# Patient Record
Sex: FEMALE | Race: WHITE | Employment: OTHER | URBAN - METROPOLITAN AREA
[De-identification: names, ages, dates, MRNs, and addresses within clinical notes are randomized per-mention and may not be internally consistent; named-entity substitution may affect disease eponyms.]

---

## 2019-08-26 RX ORDER — ESCITALOPRAM OXALATE 20 MG/1
20 TABLET ORAL DAILY
COMMUNITY

## 2019-08-26 RX ORDER — LISINOPRIL AND HYDROCHLOROTHIAZIDE 10; 12.5 MG/1; MG/1
1 TABLET ORAL DAILY
COMMUNITY

## 2019-08-26 NOTE — PERIOP NOTES
[facility]  Ambulatory Surgery Unit  Pre-operative Instructions    Surgery/Procedure Date  Thursday, August 29, 2019            Tentative Arrival Time 1145      1. On the day of your surgery/procedure, please report to the Ambulatory Surgery Unit Registration Desk and sign in at your designated time. The Ambulatory Surgery Unit is located in [facility] on the [facility] side of the [facility] across from the [facility]. Please have all of your health insurance cards and a photo ID. 2. You must have someone with you to drive you home, as you should not drive a car for 24 hours following anesthesia. Please make arrangements for a responsible adult friend or family member to stay with you for at least the first 24 hours after your surgery. 3. Do not have anything to eat or drink (including water, gum, mints, coffee, juice) after 11:59 PM. This may not apply to medications prescribed by your physician. (Please note below the special instructions with medications to take the morning of surgery, if applicable.)    4. We recommend you do not drink any alcoholic beverages for 24 hours before and after your surgery. 5. Contact your surgeons office for instructions on the following medications: non-steroidal anti-inflammatory drugs (i.e. Advil, Aleve), vitamins, and supplements. (Some surgeons will want you to stop these medications prior to surgery and others may allow you to take them)   **If you are currently taking Plavix, Coumadin, Aspirin and/or other blood-thinning agents, contact your surgeon for instructions. ** Your surgeon will partner with the physician prescribing these medications to determine if it is safe to stop or if you need to continue taking. Please do not stop taking these medications without instructions from your surgeon.     6. In an effort to help prevent surgical site infection, we ask that you shower with an anti-bacterial soap (i.e. Dial/Safeguard, or the soap provided to you at your preadmission testing appointment) for 3 days prior to and on the morning of surgery, using a fresh towel after each shower. (Please begin this process with fresh bed linens.) Do not apply any lotions, powders, or deodorants after the shower on the day of your procedure. If applicable, please do not shave the operative site for 48 hours prior to surgery. 7. Wear comfortable clothes. Wear glasses instead of contacts. Do not bring any jewelry or money (other than copays or fees as instructed). Do not wear make-up, particularly mascara, the morning of your surgery. Do not wear nail polish, particularly if you are having foot /hand surgery. Wear your hair loose or down, no ponytails, buns, anson pins or clips. All body piercings must be removed. 8. You should understand that if you do not follow these instructions your surgery may be cancelled. If your physical condition changes (i.e. fever, cold or flu) please contact your surgeon as soon as possible. 9. It is important that you be on time. If a situation occurs where you may be late, or if you have any questions or problems, please call [phone].    10. Your surgery time may be subject to change. You will receive a phone call the day prior to surgery to confirm your arrival time. 11. Pediatric patients: please bring a change of clothes, diapers, bottle/sippy cup, pacifier, etc.      Special Instructions: Take all medications and inhalers, as prescribed, on the morning of surgery with a sip of water. I understand a pre-operative phone call will be made to verify my surgery time. In the event that I am not available, I give permission for a message to be left on my answering service and/or with another person?       yes    Preop instructions reviewed  Pt verbalized understanding.      ___________________      ___________________      ________________  (Signature of Patient)          (Witness) (Date and Time)

## 2019-08-28 ENCOUNTER — ANESTHESIA EVENT (OUTPATIENT)
Dept: SURGERY | Age: 64
End: 2019-08-28
Payer: MEDICARE

## 2019-08-28 NOTE — H&P
This is a 64-year-old female who presents today with right wrist pain. Localizes it dorsally. This has been present for 6 weeks. No injury. Pain is 6/10. Does have a plate in her wrist from a surgery in 2007 performed by another surgeon. Has had no recent treatment. Past medical history, past surgical history, review of systems reviewed on intake form     Objective:   Constitutional:  No acute distress. Well nourished. Well developed. Eyes:  Sclera are nonicteric. Respiratory:  No labored breathing. Cardiovascular:  No marked edema. Skin:  No marked skin ulcers. Neurological:  see below  Psychiatric: Alert and oriented x3. Musculoskeletal   Examination of the right hand and wrist demonstrates she does have painful thumb retropulsion and EPL activation. Does have tenderness over the EPL and at Ethan's. Has tenderness palpation of the distal radius as well. Does have some pain with wrist motion. Neurovascularly intact distally.     Radiographs:       X-ray Wrist Right 3+ Views ([id number])     Result Date: 8/21/2019  Sitting. PA, Lat, Oblique, Tilted Lat.      Impression: There is plate fixation of a distal radius fracture. The radius is healed. I do have concerns for an intra-articular screw at the radial styloid as well as significant dorsal prominence of screws.        Assessment:      1. Right wrist pain    2. Painful orthopaedic hardware    3. Right wrist tendonitis          Plan: At this time she does have EPL irritation and obvious dorsal prominence of some of her screws as well as a likely intra-articular screw. At this time I do recommend hardware removal to prevent any further damage to the tendons. I will also likely perform an EPL transposition at that time. Risks benefits alternatives of this including incomplete relief due to possible arthritis and other causes are discussed. She consents to proceed. A surgical date is chosen.     Date of Surgery Update:  Dell Augustin was seen and examined. History and physical has been reviewed. The patient has been examined.  There have been no significant clinical changes since the completion of the originally dated History and Physical.    Signed By: Nelson Sanabria MD     August 29, 2019 11:51 AM

## 2019-08-29 ENCOUNTER — ANESTHESIA (OUTPATIENT)
Dept: SURGERY | Age: 64
End: 2019-08-29
Payer: MEDICARE

## 2019-08-29 ENCOUNTER — HOSPITAL ENCOUNTER (OUTPATIENT)
Age: 64
Setting detail: OUTPATIENT SURGERY
Discharge: HOME | End: 2019-08-29
Attending: ORTHOPAEDIC SURGERY | Admitting: ORTHOPAEDIC SURGERY
Payer: MEDICARE

## 2019-08-29 VITALS
SYSTOLIC BLOOD PRESSURE: 117 MMHG | OXYGEN SATURATION: 95 % | WEIGHT: 230 LBS | RESPIRATION RATE: 18 BRPM | BODY MASS INDEX: 36.96 KG/M2 | HEIGHT: 66 IN | TEMPERATURE: 98.1 F | HEART RATE: 91 BPM | DIASTOLIC BLOOD PRESSURE: 83 MMHG

## 2019-08-29 DIAGNOSIS — G89.18 POSTOPERATIVE PAIN: Primary | ICD-10-CM

## 2019-08-29 PROCEDURE — 77030040356 HC CORD BPLR FRCP COVD -A: Performed by: ORTHOPAEDIC SURGERY

## 2019-08-29 PROCEDURE — 76210000040 HC AMBSU PH I REC FIRST 0.5 HR: Performed by: ORTHOPAEDIC SURGERY

## 2019-08-29 PROCEDURE — 76030000001 HC AMB SURG OR TIME 1 TO 1.5: Performed by: ORTHOPAEDIC SURGERY

## 2019-08-29 PROCEDURE — 77030039497 HC CST PAD STERILE CHCS -A: Performed by: ORTHOPAEDIC SURGERY

## 2019-08-29 PROCEDURE — 74011250636 HC RX REV CODE- 250/636: Performed by: NURSE ANESTHETIST, CERTIFIED REGISTERED

## 2019-08-29 PROCEDURE — 76210000046 HC AMBSU PH II REC FIRST 0.5 HR: Performed by: ORTHOPAEDIC SURGERY

## 2019-08-29 PROCEDURE — 74011250636 HC RX REV CODE- 250/636: Performed by: ANESTHESIOLOGY

## 2019-08-29 PROCEDURE — 76060000062 HC AMB SURG ANES 1 TO 1.5 HR: Performed by: ORTHOPAEDIC SURGERY

## 2019-08-29 PROCEDURE — 77030002916 HC SUT ETHLN J&J -A: Performed by: ORTHOPAEDIC SURGERY

## 2019-08-29 PROCEDURE — 74011250636 HC RX REV CODE- 250/636: Performed by: ORTHOPAEDIC SURGERY

## 2019-08-29 PROCEDURE — 77030018836 HC SOL IRR NACL ICUM -A: Performed by: ORTHOPAEDIC SURGERY

## 2019-08-29 PROCEDURE — 77030003601 HC NDL NRV BLK BBMI -A: Performed by: NURSE ANESTHETIST, CERTIFIED REGISTERED

## 2019-08-29 PROCEDURE — 74011000250 HC RX REV CODE- 250: Performed by: NURSE ANESTHETIST, CERTIFIED REGISTERED

## 2019-08-29 RX ORDER — SODIUM CHLORIDE, SODIUM LACTATE, POTASSIUM CHLORIDE, CALCIUM CHLORIDE 600; 310; 30; 20 MG/100ML; MG/100ML; MG/100ML; MG/100ML
25 INJECTION, SOLUTION INTRAVENOUS CONTINUOUS
Status: DISCONTINUED | OUTPATIENT
Start: 2019-08-29 | End: 2019-08-29 | Stop reason: HOSPADM

## 2019-08-29 RX ORDER — HYDROMORPHONE HYDROCHLORIDE 1 MG/ML
.2-.5 INJECTION, SOLUTION INTRAMUSCULAR; INTRAVENOUS; SUBCUTANEOUS ONCE
Status: DISCONTINUED | OUTPATIENT
Start: 2019-08-29 | End: 2019-08-29 | Stop reason: HOSPADM

## 2019-08-29 RX ORDER — DIPHENHYDRAMINE HYDROCHLORIDE 50 MG/ML
12.5 INJECTION, SOLUTION INTRAMUSCULAR; INTRAVENOUS AS NEEDED
Status: DISCONTINUED | OUTPATIENT
Start: 2019-08-29 | End: 2019-08-29 | Stop reason: HOSPADM

## 2019-08-29 RX ORDER — ONDANSETRON 2 MG/ML
INJECTION INTRAMUSCULAR; INTRAVENOUS AS NEEDED
Status: DISCONTINUED | OUTPATIENT
Start: 2019-08-29 | End: 2019-08-29 | Stop reason: HOSPADM

## 2019-08-29 RX ORDER — ROPIVACAINE HYDROCHLORIDE 5 MG/ML
INJECTION, SOLUTION EPIDURAL; INFILTRATION; PERINEURAL
Status: COMPLETED | OUTPATIENT
Start: 2019-08-29 | End: 2019-08-29

## 2019-08-29 RX ORDER — SODIUM CHLORIDE 0.9 % (FLUSH) 0.9 %
5-40 SYRINGE (ML) INJECTION EVERY 8 HOURS
Status: DISCONTINUED | OUTPATIENT
Start: 2019-08-29 | End: 2019-08-29 | Stop reason: HOSPADM

## 2019-08-29 RX ORDER — OXYCODONE AND ACETAMINOPHEN 5; 325 MG/1; MG/1
1 TABLET ORAL
Status: DISCONTINUED | OUTPATIENT
Start: 2019-08-29 | End: 2019-08-29 | Stop reason: HOSPADM

## 2019-08-29 RX ORDER — PROPOFOL 10 MG/ML
INJECTION, EMULSION INTRAVENOUS AS NEEDED
Status: DISCONTINUED | OUTPATIENT
Start: 2019-08-29 | End: 2019-08-29 | Stop reason: HOSPADM

## 2019-08-29 RX ORDER — SODIUM CHLORIDE 0.9 % (FLUSH) 0.9 %
5-40 SYRINGE (ML) INJECTION AS NEEDED
Status: DISCONTINUED | OUTPATIENT
Start: 2019-08-29 | End: 2019-08-29 | Stop reason: HOSPADM

## 2019-08-29 RX ORDER — MIDAZOLAM HYDROCHLORIDE 1 MG/ML
INJECTION, SOLUTION INTRAMUSCULAR; INTRAVENOUS AS NEEDED
Status: DISCONTINUED | OUTPATIENT
Start: 2019-08-29 | End: 2019-08-29 | Stop reason: HOSPADM

## 2019-08-29 RX ORDER — MIDAZOLAM HYDROCHLORIDE 1 MG/ML
INJECTION, SOLUTION INTRAMUSCULAR; INTRAVENOUS
Status: COMPLETED
Start: 2019-08-29 | End: 2019-08-29

## 2019-08-29 RX ORDER — ROPIVACAINE HYDROCHLORIDE 5 MG/ML
INJECTION, SOLUTION EPIDURAL; INFILTRATION; PERINEURAL
Status: COMPLETED
Start: 2019-08-29 | End: 2019-08-29

## 2019-08-29 RX ORDER — PROPOFOL 10 MG/ML
INJECTION, EMULSION INTRAVENOUS
Status: DISCONTINUED | OUTPATIENT
Start: 2019-08-29 | End: 2019-08-29 | Stop reason: HOSPADM

## 2019-08-29 RX ORDER — CEFAZOLIN SODIUM/WATER 2 G/20 ML
2 SYRINGE (ML) INTRAVENOUS ONCE
Status: COMPLETED | OUTPATIENT
Start: 2019-08-29 | End: 2019-08-29

## 2019-08-29 RX ORDER — MORPHINE SULFATE 10 MG/ML
2 INJECTION, SOLUTION INTRAMUSCULAR; INTRAVENOUS
Status: DISCONTINUED | OUTPATIENT
Start: 2019-08-29 | End: 2019-08-29 | Stop reason: HOSPADM

## 2019-08-29 RX ORDER — FENTANYL CITRATE 50 UG/ML
25 INJECTION, SOLUTION INTRAMUSCULAR; INTRAVENOUS
Status: DISCONTINUED | OUTPATIENT
Start: 2019-08-29 | End: 2019-08-29 | Stop reason: HOSPADM

## 2019-08-29 RX ORDER — LIDOCAINE HYDROCHLORIDE 10 MG/ML
0.1 INJECTION, SOLUTION EPIDURAL; INFILTRATION; INTRACAUDAL; PERINEURAL AS NEEDED
Status: DISCONTINUED | OUTPATIENT
Start: 2019-08-29 | End: 2019-08-29 | Stop reason: HOSPADM

## 2019-08-29 RX ORDER — OXYCODONE HYDROCHLORIDE 5 MG/1
5-10 TABLET ORAL
Qty: 35 TAB | Refills: 0 | Status: SHIPPED | OUTPATIENT
Start: 2019-08-29 | End: 2019-09-12

## 2019-08-29 RX ORDER — LIDOCAINE HYDROCHLORIDE 20 MG/ML
INJECTION, SOLUTION EPIDURAL; INFILTRATION; INTRACAUDAL; PERINEURAL AS NEEDED
Status: DISCONTINUED | OUTPATIENT
Start: 2019-08-29 | End: 2019-08-29 | Stop reason: HOSPADM

## 2019-08-29 RX ORDER — DEXAMETHASONE SODIUM PHOSPHATE 4 MG/ML
INJECTION, SOLUTION INTRA-ARTICULAR; INTRALESIONAL; INTRAMUSCULAR; INTRAVENOUS; SOFT TISSUE AS NEEDED
Status: DISCONTINUED | OUTPATIENT
Start: 2019-08-29 | End: 2019-08-29 | Stop reason: HOSPADM

## 2019-08-29 RX ORDER — PHENYLEPHRINE HCL IN 0.9% NACL 0.4MG/10ML
SYRINGE (ML) INTRAVENOUS AS NEEDED
Status: DISCONTINUED | OUTPATIENT
Start: 2019-08-29 | End: 2019-08-29 | Stop reason: HOSPADM

## 2019-08-29 RX ORDER — ONDANSETRON 2 MG/ML
4 INJECTION INTRAMUSCULAR; INTRAVENOUS AS NEEDED
Status: DISCONTINUED | OUTPATIENT
Start: 2019-08-29 | End: 2019-08-29 | Stop reason: HOSPADM

## 2019-08-29 RX ORDER — FENTANYL CITRATE 50 UG/ML
INJECTION, SOLUTION INTRAMUSCULAR; INTRAVENOUS AS NEEDED
Status: DISCONTINUED | OUTPATIENT
Start: 2019-08-29 | End: 2019-08-29 | Stop reason: HOSPADM

## 2019-08-29 RX ORDER — EPHEDRINE SULFATE/0.9% NACL/PF 50 MG/5 ML
SYRINGE (ML) INTRAVENOUS AS NEEDED
Status: DISCONTINUED | OUTPATIENT
Start: 2019-08-29 | End: 2019-08-29 | Stop reason: HOSPADM

## 2019-08-29 RX ADMIN — LIDOCAINE HYDROCHLORIDE 40 MG: 20 INJECTION, SOLUTION EPIDURAL; INFILTRATION; INTRACAUDAL; PERINEURAL at 12:08

## 2019-08-29 RX ADMIN — FENTANYL CITRATE 50 MCG: 50 INJECTION, SOLUTION INTRAMUSCULAR; INTRAVENOUS at 12:08

## 2019-08-29 RX ADMIN — PROPOFOL 20 MG: 10 INJECTION, EMULSION INTRAVENOUS at 12:08

## 2019-08-29 RX ADMIN — Medication 120 MCG: at 12:50

## 2019-08-29 RX ADMIN — ROPIVACAINE HYDROCHLORIDE 30 ML: 5 INJECTION, SOLUTION EPIDURAL; INFILTRATION; PERINEURAL at 11:04

## 2019-08-29 RX ADMIN — MIDAZOLAM HYDROCHLORIDE 3 MG: 1 INJECTION, SOLUTION INTRAMUSCULAR; INTRAVENOUS at 11:09

## 2019-08-29 RX ADMIN — PROPOFOL 20 MG: 10 INJECTION, EMULSION INTRAVENOUS at 12:10

## 2019-08-29 RX ADMIN — Medication 2 G: at 12:10

## 2019-08-29 RX ADMIN — ONDANSETRON HYDROCHLORIDE 4 MG: 2 INJECTION, SOLUTION INTRAMUSCULAR; INTRAVENOUS at 12:32

## 2019-08-29 RX ADMIN — FENTANYL CITRATE 25 MCG: 50 INJECTION, SOLUTION INTRAMUSCULAR; INTRAVENOUS at 12:25

## 2019-08-29 RX ADMIN — Medication 120 MCG: at 12:59

## 2019-08-29 RX ADMIN — PROPOFOL 75 MCG/KG/MIN: 10 INJECTION, EMULSION INTRAVENOUS at 12:08

## 2019-08-29 RX ADMIN — Medication 80 MCG: at 12:42

## 2019-08-29 RX ADMIN — SODIUM CHLORIDE, SODIUM LACTATE, POTASSIUM CHLORIDE, AND CALCIUM CHLORIDE 25 ML/HR: 600; 310; 30; 20 INJECTION, SOLUTION INTRAVENOUS at 10:44

## 2019-08-29 RX ADMIN — Medication 20 MG: at 12:31

## 2019-08-29 RX ADMIN — DEXAMETHASONE SODIUM PHOSPHATE 4 MG: 4 INJECTION, SOLUTION INTRAMUSCULAR; INTRAVENOUS at 12:32

## 2019-08-29 NOTE — PERIOP NOTES
1317-Pt. To pacu, awake, alert. Vss. Denies pain. Dressing to right arm intact. 1330-Discharge instructions reviewed w/pt. And friend. They verbalized understanding. Prescription given. 1355-Pt. And friend stated ready for discharge. Vss. Denies pain. Dressing to right arm intact. Lungs clear. Sling applied to right arm. Pt. Discharged to car via wheelchair w/all belongings.

## 2019-08-29 NOTE — PERIOP NOTES
Dr. Marbella Ring performed nerve block in preop using ultrasound machine to RUE. Pt on CM x3, 02 by NC at 2L, patient responsive when spoken to. Able to answer questions appropriately. Pt did receive Versed 3 mg IV given by Dr. Marbella Ring for sedation. Pt tolerated procedure well. VSS and will continue to monitor.

## 2019-08-29 NOTE — PERIOP NOTES
Kat Moreno  1955  [id number]    Situation:  Verbal report given from: Neal Johnson CRNA  Procedure: Procedure(s):  RIGHT WRIST REMOVAL OF HARDWARE AND EXTENSOR POLLICUS LONGUS TRANSPOSITION  (REGIONAL W/BLK)    Background:    Preoperative diagnosis: Painful orthopaedic hardware ([address] [T84.84XA]  Wrist tendonitis [M77.8]    Postoperative diagnosis: Painful orthopaedic hardware [facility]) Evlyn Clap  Wrist tendonitis [M77.8]    :  Dr. Margreta Severe    Assistant(s): Circ-1: Toño Ugarte RN  Scrub Tech-1: Lizette GALLEGOS RT  Scrub RN-Relief: Alan Lorenzana RN    Specimens: * No specimens in log *    Assessment:  Intra-procedure medications         Anesthesia gave intra-procedure sedation and medications, see anesthesia flow sheet     Intravenous fluids: LR@ KVO     Vital signs stable       Recommendation:    Permission to share finding with friend Dasha Mares : yes

## 2019-08-29 NOTE — OP NOTES
PATIENT NAME:  Paula Durant     SURGEON:    Itzel Winslow MD     DATE OF SURGERY: 8/29/2019      [facility]      PREOPERATIVE DIAGNOSIS: Retained painful hardware right wrist, right EPL synovitis/injury     POSTOPERATIVE DIAGNOSIS:  Same     PROCEDURE:  Removal of hardware right wrist, right EPL dorsal transposition     ANESTHESIA: Brachial Plexus block/IV sedation      BLOOD LOSS:  Minimal     COMPLICATIONS: none      TOURNIQUET TIME: 36 min      OPERATIVE INDICATIONS:They had undergone an open reduction, internal fixation of their right distal radius with prominent dorsal and intra-articular screws with subsequent wrist pain and EPL irritation/injury. They therefore indicated for surgery. After risks benefits alternatives were discussed with the patient they consented to proceed.     DESCRIPTION  OF PROCEDURE:   On the date of operation the patient presented stable to the holding area. The correct upper extremity was identified and marked. Regional anesthesia was induced by the anesthesia team.  They were then brought to the operating room and placed supine with the operative upper extremity on a hand table and a nonsterile upper arm tourniquet placed. The upper extremity was then prepped and draped in the standard sterile fashion. After formal time-out was performed the upper extremity was elevated and exsanguinated and the upper arm tourniquet was inflated to 250 mm of mercury.     A standard volar Ethel Gu approach was then taken to the distal radius at the prior incision. Skin and subcutaneous tissue were taken down sharply and the FCR was visualized. The FCR sheath was then carefully incised and this was retracted ulnarly. Fascia overlying the flexor pollicis was then carefully incised and this was also retracted ulnarly. The pronator quadratus was then visualized and this was stripped subperiosteally off bone. The plate was then visualized.  This was debrided of scar tissue and the distal locking screws were removed. The most distal cortical screw had significant bony overgrowth so an osteotome did need to be used to fully approach the screw and the remove it. The remainder of the screws were then removed followed by the plate. The distal radius was assessed and no fractures remaining were noted. I then made a dorsal incision just ulnar to listers tubercle. Skin and subcutaneous tissue was taken down sharply and deeper dissection was performed with dissecting scissors to the extensor retinaculum. The 3rd dorsal compartment was opened and released. The ePL was evaluated and there was significant irritation and hyperemia. The EPl was then transposed dorsally and the retinaculum closed back under with 3-0 ethibond. Final x-rays were obtained. Tourniquet was then taken down. Copious irrigation was performed and all bleeding sites were carefully coagulated with bipolar cautery. Skin was closed with 3 0 nylon in a interrupted horizontal mattress fashion. Patient was then placed in sterile bulky dressing followed by a thumb spica splint.   They were then taken stable to the recovery room with all instrument, needle and lap counts correct at the end of the case.

## 2019-08-29 NOTE — DISCHARGE INSTRUCTIONS
[facility]  Post-operative instructions  For: Idris Farnsworth    Your first postop appointment should be scheduled with Dr. Elver Nayak for 2 weeks post-op. [address], [address]  [place]  Phone: [phone]  Hand Therapy Phone: [phone]  Fax: [phone]    Please follow these instructions for a safe and speedy recovery:    1. Surgical Bandage: Leave the bandage in place until we remove it. Please keep it clean and dry. To shower or bathe, apply a plastic bag or GLAD Press'n Seal® plastic wrap around the bandage or simply sponge bathe. 2. Elevation: Hand swelling is best prevented by keeping your hand elevated above the level of your heart at all times, night and day. The opposite, dangling your hand below your waist, will cause additional pain, swelling, and later stiffness. You can elevate the hand in a sling or by propping it on a pillow at night. Occasionally, we will provide you with a custom-made foam block for elevating the arm. Ice compresses may help but do not rep[lace elevation. Frequently, extreme pain is caused by a tight bandage, which should be loosened. If pain is severe and progressive, call us at [phone] during the day (ask for immediate connection to Dr. Vasile Whitley Team) or during the night (ask for the on-call physician). 3. Medication: You will be provided with an appropriate pain medication (over-the-counter or prescription). Please fill this at a pharmacy promptly so you will have it available when all local anesthetic wears off. Take this to relieve pain as directed on the bottle. Please refrain from driving, drinking alcohol, and making important medical decisions while taking the medication. Please call us if you need something stronger. Medication changes or refills must be made before 5pm or through your pharmacy.     4. Weight bearing: Do NOT bear any weight on the operative extremity. I want to thank you for choosing us for your hand care needs. My staff and I are committed to providing all our customers with the highest quality hand surgery and subsequent hand therapy care as possible. We want your recovery to be comfortable. If you have clinical non-emergent questions about your surgery or other hand conditions please call [phone] and ask for my team. Your call will be returned within 24 hours. TO PREVENT AN INFECTION      1. [address] YOUR HANDS     To prevent infection, good handwashing is the most important thing you or your caregiver can do.  Wash your hands with soap and water or use the hand  we gave you before you touch any wounds. 2. SHOWER     Use the antibacterial soap we gave you when you take a shower.  Shower with this soap until your wounds are healed.  To reach all areas of your body, you may need someone to help you.  Dont forget to clean your belly button with every shower. 3.  USE CLEAN SHEETS     Use freshly cleaned sheets on your bed after surgery.  To keep the surgery site clean, do not allow pets to sleep with you while your wound is still healing. 4. STOP SMOKING     Stop smoking, or at least cut back on smoking     Smoking slows your healing. 5.  CONTROL YOUR BLOOD SUGAR     High blood sugars slow wound healing.  If you are diabetic, control your blood sugar levels before and after your surgery. DO NOT TAKE TYLENOL/ACETAMINOPHEN WITH PERCOCET, [facility], [address] TAKE NARCOTIC PAIN MEDICATIONS WITH FOOD     Narcotics tend to be constipating, we suggest taking a stool softener such as Colace or Miralax (follow package instructions). DO NOT DRIVE WHILE TAKING NARCOTIC PAIN MEDICATIONS. DO NOT TAKE SLEEPING MEDICATIONS OR ANTIANXIETY MEDICATIONS WHILE TAKING NARCOTIC PAIN MEDICATIONS,  ESPECIALLY THE NIGHT OF ANESTHESIA!     CPAP PATIENTS BE SURE TO WEAR MACHINE WHENEVER NAPPING OR SLEEPING! DISCHARGE SUMMARY from Nurse    The following personal items collected during your admission are returned to you:   Dental Appliance: Dental Appliances: None  Vision: Visual Aid: Glasses(PACU)  Hearing Aid:    Jewelry: Jewelry: None  Clothing: Clothing: With patient  Other Valuables: Other Valuables: Eyeglasses(PACU)  Valuables sent to safe:        PATIENT INSTRUCTIONS:    After General Anesthesia or Intravenous Sedation, for 24 hours or while taking prescription Narcotics:        Someone should be with you for the next 24 hours. For your own safety, a responsible adult must drive you home. · Limit your activities  · Recommended activity: Rest today, up with assistance today. Do not climb stairs or shower unattended for the next 24 hours. · Please start with a soft bland diet and advance as tolerated (no nausea) to regular diet. · If you have a sore throat you should try the following: fluids, warm salt water gargles, or throat lozenges. If it does not improve after several days please follow up with your primary physician. · Do not drive and operate hazardous machinery  · Do not make important personal or business decisions  · Do  not drink alcoholic beverages  · If you have not urinated within 8 hours after discharge, please contact your surgeon on call. Report the following to your surgeon:  · Excessive pain, swelling, redness or odor of or around the surgical area  · Temperature over 100.5  · Nausea and vomiting lasting longer than 4 hours or if unable to take medications  · Any signs of decreased circulation or nerve impairment to extremity: change in color, persistent  numbness, tingling, coldness or increase pain      · You will receive a Post Operative Call from one of the Recovery Room Nurses on the day after your surgery to check on you. It is very important for us to know how you are recovering after your surgery.  If you have an issue or need to speak with someone, please call your surgeon, do not wait for the post operative call. · You may receive an e-mail or letter in the mail from [facility] regarding your experience with us in the Ambulatory Surgery Unit. Your feedback is valuable to us and we appreciate your participation in the survey. · If the above instructions are not adequate or you are having problems after your surgery, call the physician at their office number. · We wish you a speedy recovery ? What to do at Home:      *  Please give a list of your current medications to your Primary Care Provider. *  Please update this list whenever your medications are discontinued, doses are      changed, or new medications (including over-the-counter products) are added. *  Please carry medication information at all times in case of emergency situations. If you have not received your influenza and/or pneumococcal vaccine, please follow up with your primary care physician. The discharge information has been reviewed with the patient and caregiver. The patient and caregiver verbalized understanding.

## 2019-08-29 NOTE — ANESTHESIA PROCEDURE NOTES
Peripheral Block    Start time: 8/29/2019 10:56 AM  End time: 8/29/2019 11:08 AM  Performed by: Asa Schuster MD  Authorized by: Asa Schuster MD       Pre-procedure:    Indications: at surgeon's request, post-op pain management and primary anesthetic    Preanesthetic Checklist: patient identified, risks and benefits discussed, site marked, timeout performed, anesthesia consent given and patient being monitored    Timeout Time: 10:58          Block Type:   Block Type:  Supraclavicular  Laterality:  Right  Monitoring:  Standard ASA monitoring, responsive to questions and oxygen  Injection Technique:  Single shot  Procedures: ultrasound guided    Patient Position: supine  Prep: chlorhexidine    Location:  Supraclavicular  Needle Type:  Stimuplex  Needle Gauge:  22 G  Needle Localization:  Ultrasound guidance    Assessment:  Number of attempts:  1  Injection Assessment:  Incremental injection every 5 mL, no paresthesia, ultrasound image on chart, local visualized surrounding nerve on ultrasound, negative aspiration for blood and no intravascular symptoms  Patient tolerance:  Patient tolerated the procedure well with no immediate complications

## 2019-08-29 NOTE — PERIOP NOTES
Permission received to review discharge instructions and discuss private health information with Darren Seals, friend.

## 2019-08-29 NOTE — ANESTHESIA POSTPROCEDURE EVALUATION
Procedure(s):  RIGHT WRIST REMOVAL OF HARDWARE AND EXTENSOR POLLICUS LONGUS TRANSPOSITION  (REGIONAL W/BLK). regional, MAC    Anesthesia Post Evaluation      Multimodal analgesia: multimodal analgesia used between 6 hours prior to anesthesia start to PACU discharge  Patient location during evaluation: bedside  Patient participation: complete - patient participated  Level of consciousness: awake and alert  Pain score: 0  Airway patency: patent  Anesthetic complications: no  Cardiovascular status: acceptable  Respiratory status: acceptable  Hydration status: acceptable  Comments: Pt has supraclavicular block. Sling postop until block resolves.   Post anesthesia nausea and vomiting:  none      Vitals Value Taken Time   /62 8/29/2019  1:29 PM   Temp 36.7 °C (98.1 °F) 8/29/2019  1:29 PM   Pulse 96 8/29/2019  1:29 PM   Resp 20 8/29/2019  1:29 PM   SpO2 94 % 8/29/2019  1:29 PM

## 2019-08-29 NOTE — ANESTHESIA PREPROCEDURE EVALUATION
Relevant Problems   No relevant active problems       Anesthetic History   No history of anesthetic complications            Review of Systems / Medical History  Patient summary reviewed, nursing notes reviewed and pertinent labs reviewed    Pulmonary  Within defined limits                 Neuro/Psych   Within defined limits           Cardiovascular    Hypertension              Exercise tolerance: >4 METS     GI/Hepatic/Renal     GERD (occasionaly)           Endo/Other  Within defined limits           Other Findings   Comments: Eczema  Painful right wrist hardware         Physical Exam    Airway  Mallampati: II  TM Distance: 4 - 6 cm  Neck ROM: normal range of motion   Mouth opening: Normal     Cardiovascular    Rhythm: regular  Rate: normal         Dental    Dentition: Caps/crowns  Comments:  On molars   Pulmonary  Breath sounds clear to auscultation               Abdominal  GI exam deferred       Other Findings            Anesthetic Plan    ASA: 2  Anesthesia type: regional and MAC - supraclavicular block      Post-op pain plan if not by surgeon: peripheral nerve block single    Induction: Intravenous  Anesthetic plan and risks discussed with: Patient

## 2023-05-19 RX ORDER — LISINOPRIL AND HYDROCHLOROTHIAZIDE 12.5; 1 MG/1; MG/1
1 TABLET ORAL DAILY
COMMUNITY

## 2023-05-19 RX ORDER — ESCITALOPRAM OXALATE 20 MG/1
20 TABLET ORAL DAILY
COMMUNITY

## (undated) DEVICE — HAND I-LF: Brand: MEDLINE INDUSTRIES, INC.

## (undated) DEVICE — SOLUTION IV 1000ML 0.9% SOD CHL

## (undated) DEVICE — INTENDED FOR TISSUE SEPARATION, AND OTHER PROCEDURES THAT REQUIRE A SHARP SURGICAL BLADE TO PUNCTURE OR CUT.: Brand: BARD-PARKER ® CARBON RIB-BACK BLADES

## (undated) DEVICE — PADDING CAST 4 INX5 YD STRL

## (undated) DEVICE — (D)PREP SKN CHLRAPRP APPL 26ML -- CONVERT TO ITEM 371833

## (undated) DEVICE — DRESSING,GAUZE,XEROFORM,CURAD,1"X8",ST: Brand: CURAD

## (undated) DEVICE — Z DISCONTINUED PER MEDLINE (LOW STOCK)  USE 2422770 DRAPE C ARM W54XL78IN FOR FLROSCN

## (undated) DEVICE — STERILE POLYISOPRENE POWDER-FREE SURGICAL GLOVES WITH EMOLLIENT COATING: Brand: PROTEXIS

## (undated) DEVICE — BANDAGE,GAUZE,BULKEE II,4.5"X4.1YD,STRL: Brand: MEDLINE

## (undated) DEVICE — BANDAGE COMPR SELF ADH 5 YDX4 IN TAN STRL PREMIERPRO LF

## (undated) DEVICE — TOWEL SURG W17XL27IN STD BLU COT NONFENESTRATED PREWASHED

## (undated) DEVICE — SUTURE ETHLN SZ 4-0 L18IN NONABSORBABLE BLK L19MM PS-2 3/8 1667H

## (undated) DEVICE — 1200 GUARD II KIT W/5MM TUBE W/O VAC TUBE: Brand: GUARDIAN

## (undated) DEVICE — STERILE POLYISOPRENE POWDER-FREE SURGICAL GLOVES: Brand: PROTEXIS

## (undated) DEVICE — COVER LT HNDL PLAS RIG 1 PER PK

## (undated) DEVICE — BIPOLAR FORCEPS CORD: Brand: VALLEYLAB

## (undated) DEVICE — SPONGE GZ W4XL4IN COT 12 PLY TYP VII WVN C FLD DSGN

## (undated) DEVICE — INFECTION CONTROL KIT SYS